# Patient Record
Sex: FEMALE | Race: WHITE
[De-identification: names, ages, dates, MRNs, and addresses within clinical notes are randomized per-mention and may not be internally consistent; named-entity substitution may affect disease eponyms.]

---
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
## 2021-09-12 ENCOUNTER — HOSPITAL ENCOUNTER (EMERGENCY)
Dept: HOSPITAL 74 - JERFT | Age: 21
Discharge: HOME | End: 2021-09-12
Payer: COMMERCIAL

## 2021-09-12 VITALS — SYSTOLIC BLOOD PRESSURE: 113 MMHG | DIASTOLIC BLOOD PRESSURE: 73 MMHG | HEART RATE: 94 BPM | TEMPERATURE: 98.3 F

## 2021-09-12 VITALS — BODY MASS INDEX: 19.6 KG/M2

## 2021-09-12 DIAGNOSIS — R07.9: Primary | ICD-10-CM
